# Patient Record
Sex: MALE | Race: BLACK OR AFRICAN AMERICAN
[De-identification: names, ages, dates, MRNs, and addresses within clinical notes are randomized per-mention and may not be internally consistent; named-entity substitution may affect disease eponyms.]

---

## 2017-02-22 ENCOUNTER — HOSPITAL ENCOUNTER (EMERGENCY)
Dept: HOSPITAL 62 - ER | Age: 22
Discharge: HOME | End: 2017-02-22
Payer: MEDICAID

## 2017-02-22 VITALS — DIASTOLIC BLOOD PRESSURE: 63 MMHG | SYSTOLIC BLOOD PRESSURE: 107 MMHG

## 2017-02-22 DIAGNOSIS — F41.0: Primary | ICD-10-CM

## 2017-02-22 DIAGNOSIS — R07.89: ICD-10-CM

## 2017-02-22 PROCEDURE — 93005 ELECTROCARDIOGRAM TRACING: CPT

## 2017-02-22 PROCEDURE — 93010 ELECTROCARDIOGRAM REPORT: CPT

## 2017-02-22 PROCEDURE — 71010: CPT

## 2017-02-22 PROCEDURE — 99284 EMERGENCY DEPT VISIT MOD MDM: CPT

## 2017-02-22 PROCEDURE — 84484 ASSAY OF TROPONIN QUANT: CPT

## 2017-02-22 PROCEDURE — 36415 COLL VENOUS BLD VENIPUNCTURE: CPT

## 2017-02-22 NOTE — ER DOCUMENT REPORT
ED General





- General


Chief Complaint: Anxiety


Stated Complaint: ANXIETY


Notes: 


Patient is a 22-year-old male presents with concerns of chest pain.  States 

that he was sitting on his couch and he began to experience left-sided chest 

pain that was sharp and stabbing in nature.  Nothing improved or worsen the 

pain.  States after several minutes of the pain he began to experience 

shortness of breath and began breathing quickly.  He felt "like my legs were 

full of ice" and states she was unable to get up off the couch without 

difficulty.  States he has a long-standing history of similar episodes in the 

past.  He denies any personal cardiac history.  No history of DVT or pulmonary 

embolus.  


TRAVEL OUTSIDE OF THE [place] IN LAST 30 DAYS: No





- Related Data


Allergies/Adverse Reactions: 


 





amoxicillin [Amoxicillin] Allergy (Verified 06/27/16 21:30)


 Anaphylaxis


ampicillin [Ampicillin] Allergy (Verified 06/27/16 21:30)


 Anaphylaxis


Penicillins Allergy (Verified 06/27/16 21:30)


 Anaphylaxis











Past Medical History





- General


Information source: Patient





- Social History


Smoking Status: Never Smoker


Frequency of alcohol use: None


Drug Abuse: None


Lives with: Family


Family History: Reviewed & Not Pertinent


Pulmonary Medical History: Reports: Hx Asthma





- Immunizations


Immunizations up to date: Yes


Hx Diphtheria, Pertussis, Tetanus Vaccination: Yes





Review of Systems





- Review of Systems


Notes: 


Constitutional: Negative for fever.


HENT: Negative for sore throat.


Eyes: Negative for visual changes.


Cardiovascular: Positive for chest pain.


Respiratory: Positive for shortness of breath.


Gastrointestinal: Negative for abdominal pain, vomiting or diarrhea.


Genitourinary: Negative for dysuria.


Musculoskeletal: Negative for back pain.


Skin: Negative for rash.


Neurological: Negative for headaches, weakness or numbness.





10 point ROS negative except as marked above and in HPI.





Physical Exam





- Vital signs


Interpretation: Normal


Notes: 


PHYSICAL EXAMINATION:





GENERAL: Well-appearing, well-nourished and in no acute distress.





HEAD: Atraumatic, normocephalic.





EYES: Pupils equal round and reactive to light, extraocular movements intact, 

sclera anicteric, conjunctiva are normal.





ENT: nares patent, oropharynx clear without exudates.  Moist mucous membranes.





NECK: Normal range of motion, supple without lymphadenopathy





LUNGS: Breath sounds clear to auscultation bilaterally and equal.  No wheezes 

rales or rhonchi.





HEART: Regular rate and rhythm without murmurs





ABDOMEN: Soft, nontender, normoactive bowel sounds.  No guarding, no rebound.  

No masses appreciated.





EXTREMITIES: Normal range of motion, no pitting or edema.  No cyanosis.





NEUROLOGICAL: No focal neurological deficits. Moves all extremities 

spontaneously and on command.





PSYCH: Anxious, tearful





SKIN: Warm, Dry, normal turgor, no rashes or lesions noted.





Course





- Re-evaluation


Re-evalutation: 





02/22/17 02:43


Patient presents with history most consistent with an acute panic attack.  The 

patient admits that these are symptoms identical to prior occasions of panic.  

There was a clear trigger for tonight's episode.  Symptoms did resolve after 

receiving medical therapy here in the emergency department.  Vitals otherwise 

within normal limits.  I do not suspect an acute pulmonary embolus, ACS, 

pneumothorax, or any other acute left threatening pathology based on history 

and exam.  I do not believe any labs or imaging are indicated at this time.  At 

this time will discharge with return precautions and follow-up recommendations.

  Verbal discharge instructions given a the bedside and opportunity for 

questions given. Medication warnings reviewed. Patient is in agreement with 

this plan and has verbalized understanding of return precautions and the need 

for primary care follow-up in the next 24-72 hours.





- Diagnostic Test


Radiology reviewed: Image reviewed, Reports reviewed


Radiology results interpreted by me: 





02/22/17 04:15


Chest x-ray: No acute infiltrate or pneumothorax





- EKG Interpretation by Me


Additional EKG results interpreted by me: 





02/22/17 04:15


Sinus bradycardia.  Rate 53.  No ST elevations or depressions.  QTC is 380.





Discharge





- Discharge


Clinical Impression: 


 Panic attack, Chest wall pain





Condition: Good


Disposition: HOME, SELF-CARE


Instructions:  Anxiety ([facility])


Additional Instructions: 


You were seen today for symptoms that are most suggestive of a panic attack.  

The symptoms can come on without any clear trigger.  It is important that you 

follow-up with your primary care physician regarding anxiety and panic attacks 

as there are medications that can help prevent these attacks or stop them once 

they start.  Please return to the emergency department immediately if you began 

having chest pain, shortness of breath, vomiting, difficulty breathing, or if 

you are again having a panic attack.  Please also return if you have any 

additional symptoms that are concerning to you.

## 2017-02-23 NOTE — EKG REPORT
SEVERITY:- NORMAL ECG -

SINUS RHYTHM

ST ELEV, PROBABLE NORMAL EARLY REPOL PATTERN

:

Confirmed by: Selina Manzanares MD 23-Feb-2017 08:14:37

## 2017-05-10 ENCOUNTER — HOSPITAL ENCOUNTER (EMERGENCY)
Dept: HOSPITAL 62 - ER | Age: 22
Discharge: HOME | End: 2017-05-10
Payer: COMMERCIAL

## 2017-05-10 VITALS — DIASTOLIC BLOOD PRESSURE: 70 MMHG | SYSTOLIC BLOOD PRESSURE: 135 MMHG

## 2017-05-10 DIAGNOSIS — R07.1: ICD-10-CM

## 2017-05-10 DIAGNOSIS — F17.200: ICD-10-CM

## 2017-05-10 DIAGNOSIS — K02.9: ICD-10-CM

## 2017-05-10 DIAGNOSIS — R19.7: ICD-10-CM

## 2017-05-10 DIAGNOSIS — J45.909: ICD-10-CM

## 2017-05-10 DIAGNOSIS — R59.1: ICD-10-CM

## 2017-05-10 DIAGNOSIS — R53.81: ICD-10-CM

## 2017-05-10 DIAGNOSIS — Z88.0: ICD-10-CM

## 2017-05-10 DIAGNOSIS — J02.9: Primary | ICD-10-CM

## 2017-05-10 DIAGNOSIS — Z87.892: ICD-10-CM

## 2017-05-10 PROCEDURE — 99283 EMERGENCY DEPT VISIT LOW MDM: CPT

## 2017-05-10 NOTE — ER DOCUMENT REPORT
ED General





- General


Chief Complaint: Fever


Stated Complaint: BODY PAIN,MOUTH PAIN,DIARRHEA


Time Seen by Provider: 05/10/17 14:45


Mode of Arrival: Ambulatory


Information source: Patient


Notes: 


22-year-old male with one-week history of bilateral sore throat left greater 

than right along with diffuse body aches, diarrhea, sharp anterior chest pain 

with deep breathing, and pain in the left upper tooth which she says it been 

fractured for about 3 years.  He is not aware of any fevers or chills at home.  

He denies any specific chest or abdominal pain.


Physical Exam:





General: Alert, appears well. 





HEENT: Normocephalic. Atraumatic. PERRLA. Extraocular movements intact.  Discs 

sharp Tympanic membranes and canals clear oropharynx shows erythema and whitish 

like to tonsillar area bilaterally.  There is dental defect to left upper molar 

no surrounding erythema or discharge suggestive of acute infection.  He has no 

stridor or hoarseness or drooling





Neck: Supple tender adenopathy bilaterally trachea midline no masses palpated.





Respiratory: No respiratory distress. Clear and equal breath sounds bilaterally.





Cardiovascular: Regular rate and rhythm. 





Abdominal: Normal Inspection. Soft, non-tender. No distension. Normal Bowel 

Sounds. 





Back: Non-tender. No deformity or step off.





Extremities warm to plus pulses of cyanosis no edema





Neurological: Each clear mentation normal





Psychological: Normal affect. Normal Mood. 





Skin: Warm. Dry. Normal color.


TRAVEL OUTSIDE OF THE [place] IN LAST 30 DAYS: No





- Related Data


Allergies/Adverse Reactions: 


 





amoxicillin [Amoxicillin] Allergy (Verified 06/27/16 21:30)


 Anaphylaxis


ampicillin [Ampicillin] Allergy (Verified 06/27/16 21:30)


 Anaphylaxis


Penicillins Allergy (Verified 06/27/16 21:30)


 Anaphylaxis











Past Medical History





- Social History


Smoking Status: Current Every Day Smoker


Chew tobacco use (# tins/day): No


Frequency of alcohol use: Occasional


Family History: Reviewed & Not Pertinent


Patient has suicidal ideation: No


Patient has homicidal ideation: No


Pulmonary Medical History: Reports: Hx Asthma


Renal/ Medical History: Denies: Hx Peritoneal Dialysis





- Immunizations


Immunizations up to date: Yes


Hx Diphtheria, Pertussis, Tetanus Vaccination: Yes





Review of Systems





- Review of Systems


Constitutional: Malaise


EENT: See HPI


Cardiovascular: See HPI


Respiratory: denies: Cough, Short of breath


Gastrointestinal: denies: Vomiting


Genitourinary: denies: Burning


Musculoskeletal: denies: Back pain


Skin: denies: Rash


Hematologic/Lymphatic: Enlarged lymph nodes


Neurological/Psychological: denies: Weakness, Numbness





Physical Exam





- Vital signs


Vitals: 





 











Temp Pulse Resp BP Pulse Ox


 


 101.2 F H  78   18   135/70 H  95 


 


 05/10/17 14:28  05/10/17 14:28  05/10/17 14:28  05/10/17 14:28  05/10/17 14:28














Course





- Re-evaluation


Re-evalutation: 





05/10/17 14:48


Patient presentation and exam consistent with acute pharyngitis.  We discharged 

with Zithromax Z-Jamie skims penicillin allergy


Predental defect will follow-up with the dental office





- Vital Signs


Vital signs: 





 











Temp Pulse Resp BP Pulse Ox


 


 101.2 F H  78   18   135/70 H  95 


 


 05/10/17 14:28  05/10/17 14:28  05/10/17 14:28  05/10/17 14:28  05/10/17 14:28














Discharge





- Discharge


Clinical Impression: 


 Dental caries





Acute pharyngitis


Qualifiers:


 Pharyngitis/tonsillitis etiology: unspecified etiology Qualified Code(s): 

J02.9 - Acute pharyngitis, unspecified





Condition: Stable


Disposition: HOME, SELF-CARE


Additional Instructions: 


Sore Throat





     Sore throats may be caused by viruses, bacteria, or fungi.  Most are due 

to a virus, and must get better on their own.  Bacterial sore throats, 

particularly those due to "strep," need treatment with antibiotics.


     If an antibiotic is prescribed, be sure to take the medication for a full 

10 days.  Failure to take the antibiotic can result in complications such as 

rheumatic fever.  Sometimes, an injection of antibiotics is given instead of 

pills or liquid.  This single "shot" is equal in effectiveness to the oral 

medication.


     To relieve symptoms, take acetaminophen for pain.  Sip clear liquids 

frequently, or eat popsicles or ice chips.  Anesthetic sprays or lozenges may 

help.  Make sure the air in the room is not too dry. Avoid using decongestants 

or antihistamines.


     Call the doctor if there is no improvement in two days, or if you have 

difficulty breathing, increasing throat pain, high fever, rash, or frequent 

vomiting.





Prescriptions: 


Azithromycin [Zithromax 250 mg Tablet] 250 mg PO ASDIR PRN #6 tablet


 PRN Reason: 


Referrals: 


[facility] [Provider Group] - Follow up as needed


[facility] [Provider Group] - Follow up as needed

## 2019-04-12 ENCOUNTER — HOSPITAL ENCOUNTER (EMERGENCY)
Dept: HOSPITAL 62 - ER | Age: 24
Discharge: HOME | End: 2019-04-12
Payer: MEDICAID

## 2019-04-12 VITALS — SYSTOLIC BLOOD PRESSURE: 136 MMHG | DIASTOLIC BLOOD PRESSURE: 67 MMHG

## 2019-04-12 DIAGNOSIS — R10.84: ICD-10-CM

## 2019-04-12 DIAGNOSIS — F17.200: ICD-10-CM

## 2019-04-12 DIAGNOSIS — R11.2: Primary | ICD-10-CM

## 2019-04-12 PROCEDURE — 99283 EMERGENCY DEPT VISIT LOW MDM: CPT

## 2019-04-12 NOTE — ER DOCUMENT REPORT
HPI





- HPI


Pain Level: 3


Notes: 





Patient is a 24-year-old male who presents requesting a work note as he missed 

work today for nausea and vomiting last night.  Patient believes that he ate bad

food at a [employer] restaurant yesterday abdominal cramping with nausea and 

vomiting.  Those symptoms have since resolved.  Patient states that his work is 

requesting a note.  He has no other concerns or complaints.  He is eating and 

drinking without difficulty.  He is urinating normally and having normal bowel 

moves.  No significant past medical history or surgical history to his abdomen. 

Denies any headache, fever, URI, sore throat, chest pain, palpitations, syncope,

cough, shortness of breath, wheeze, dyspnea, diarrhea, urinary retention, 

dysuria, hematuria, or rash.





- ROS


Systems Reviewed and Negative: Yes All other systems reviewed and negative





- REPRODUCTIVE


Reproductive: DENIES: Pregnant:





Past Medical History





- Social History


Smoking Status: Current Every Day Smoker


Family History: Reviewed & Not Pertinent


Pulmonary Medical History: Reports: Hx Asthma


Renal/ Medical History: Denies: Hx Peritoneal Dialysis





- Immunizations


Immunizations up to date: Yes


Hx Diphtheria, Pertussis, Tetanus Vaccination: Yes





Vertical Provider Document





- CONSTITUTIONAL


Agree With Documented VS: Yes


Notes: 





PHYSICAL EXAMINATION:





GENERAL: Well-appearing, well-nourished and in no acute distress.





HEAD: Atraumatic, normocephalic.





EYES: Pupils equal round and reactive to light, extraocular movements intact, 

sclera anicteric, conjunctiva are normal.





ENT:  Nares patent and without discharge.  oropharynx clear without exudates.  

No tonsilar hypertrophy or erythema.  Moist mucous membranes. 





NECK: Normal range of motion, supple without lymphadenopathy





LUNGS: Breath sounds clear to auscultation bilaterally and equal.  No wheezes 

rales or rhonchi.





HEART: Regular rate and rhythm without murmurs, rubs, gallops.





ABDOMEN: Soft, nontender, nondistended abdomen.  No guarding, no rebound.  No 

masses appreciated.  Normal bowel sounds present.  No CVA tenderness 

bilaterally.





PSYCH: Normal mood, normal affect.





SKIN: Warm, Dry, normal turgor, no rashes or lesions noted.





- INFECTION CONTROL


TRAVEL OUTSIDE OF THE [place] IN LAST 30 DAYS: No





Course





- Re-evaluation


Re-evalutation: 





04/12/19 15:55


Patient is an afebrile, well-hydrated, 24-year-old male who presents to the 

emergency department with resolved nausea, vomiting, and abdominal cramping.  

Vitals are acceptable without significant tachycardia, tachypnea, or hypoxia.  

PE is otherwise unremarkable.  Patient's abdomen is soft nontender.  He is 

nontoxic-appearing and is tolerating p.o. without difficulty.  Patient is 

currently asymptomatic.  No labs or imaging warranted.  Work note will be 

provided.  Low suspicion/risk for acute appendicitis, bowel obstruction, acute 

cholecystitis, perforated diverticulitis, incarcerated hernia, pancreatitis, 

perforated ulcer, peritonitis, sepsis, testicular torsion, or other systemic 

emergent condition at this time.  Patient is aware that his condition can change

from initial presentation and he needs to monitor symptoms closely and seek 

medical attention if any acute changes.  Conservative measures otherwise for sym

ptoms.  Recheck with PCM in 2-3 days.  Consider consult with a 

gastroenterologist.  Return to the ED with any worsening/concerning symptoms 

otherwise as reviewed in discharge.  Patient is in agreement.





- Vital Signs


Vital signs: 


                                        











Temp Pulse Resp BP Pulse Ox


 


 98.0 F   62   16   136/67 H  98 


 


 04/12/19 15:29  04/12/19 15:29  04/12/19 15:29  04/12/19 15:29  04/12/19 15:29














Discharge





- Discharge


Clinical Impression: 


Nausea & vomiting


Qualifiers:


 Vomiting type: unspecified Vomiting Intractability: non-intractable Qualified 

Code(s): R11.2 - Nausea with vomiting, unspecified





Condition: Stable


Disposition: HOME, SELF-CARE


Additional Instructions: 


Maintain adequate fluid and food intake


[place] diet ([employer] Bananas, rice, apples, toast, etc


tylenol if needed


Monitor for any worsening symptoms


Make sure you are staying hydrated enough to urinate and have normal BM's


Recheck with your PCM in 2-3 days


Consider consult with Gastroenterology for ongoing/worsening symptoms


Return to the ED with any worsening symptoms and/or development of fever, 

headache, chest pain, palpitations, syncope, shortness of breath, trouble 

breathing, abdominal pain, n/v/d, blood in stool/urine, weakness, or other 

worsening symptoms that are concerning to you.  


Forms:  Elevated Blood Pressure, Smoking Cessation Education, Return to Work


Referrals: 


ELEAZAR PARKINSON MD [ACTIVE STAFF] - Follow up as needed

## 2019-04-13 ENCOUNTER — HOSPITAL ENCOUNTER (EMERGENCY)
Dept: HOSPITAL 62 - ER | Age: 24
Discharge: LEFT BEFORE DISCHARGE | End: 2019-04-13
Payer: MEDICAID

## 2019-04-13 DIAGNOSIS — Z53.21: Primary | ICD-10-CM

## 2019-04-13 LAB
ADD MANUAL DIFF: NO
ALBUMIN SERPL-MCNC: 3.8 G/DL (ref 3.5–5)
ALP SERPL-CCNC: 62 U/L (ref 38–126)
ALT SERPL-CCNC: 37 U/L (ref 21–72)
ANION GAP SERPL CALC-SCNC: 5 MMOL/L (ref 5–19)
APPEARANCE UR: CLEAR
APTT PPP: YELLOW S
AST SERPL-CCNC: 21 U/L (ref 17–59)
BASOPHILS # BLD AUTO: 0 10^3/UL (ref 0–0.2)
BASOPHILS NFR BLD AUTO: 0.5 % (ref 0–2)
BILIRUB DIRECT SERPL-MCNC: 0.2 MG/DL (ref 0–0.4)
BILIRUB SERPL-MCNC: 0.3 MG/DL (ref 0.2–1.3)
BILIRUB UR QL STRIP: NEGATIVE
BUN SERPL-MCNC: 18 MG/DL (ref 7–20)
CALCIUM: 9.3 MG/DL (ref 8.4–10.2)
CHLORIDE SERPL-SCNC: 106 MMOL/L (ref 98–107)
CO2 SERPL-SCNC: 28 MMOL/L (ref 22–30)
EOSINOPHIL # BLD AUTO: 0.2 10^3/UL (ref 0–0.6)
EOSINOPHIL NFR BLD AUTO: 2.9 % (ref 0–6)
ERYTHROCYTE [DISTWIDTH] IN BLOOD BY AUTOMATED COUNT: 13.2 % (ref 11.5–14)
GLUCOSE SERPL-MCNC: 82 MG/DL (ref 75–110)
GLUCOSE UR STRIP-MCNC: NEGATIVE MG/DL
HCT VFR BLD CALC: 39.8 % (ref 37.9–51)
HGB BLD-MCNC: 14.4 G/DL (ref 13.5–17)
KETONES UR STRIP-MCNC: NEGATIVE MG/DL
LIPASE SERPL-CCNC: 271.9 U/L (ref 23–300)
LYMPHOCYTES # BLD AUTO: 2.6 10^3/UL (ref 0.5–4.7)
LYMPHOCYTES NFR BLD AUTO: 33.3 % (ref 13–45)
MCH RBC QN AUTO: 30.1 PG (ref 27–33.4)
MCHC RBC AUTO-ENTMCNC: 36.3 G/DL (ref 32–36)
MCV RBC AUTO: 83 FL (ref 80–97)
MONOCYTES # BLD AUTO: 0.6 10^3/UL (ref 0.1–1.4)
MONOCYTES NFR BLD AUTO: 7.3 % (ref 3–13)
NEUTROPHILS # BLD AUTO: 4.4 10^3/UL (ref 1.7–8.2)
NEUTS SEG NFR BLD AUTO: 56 % (ref 42–78)
NITRITE UR QL STRIP: NEGATIVE
PH UR STRIP: 6 [PH] (ref 5–9)
PLATELET # BLD: 229 10^3/UL (ref 150–450)
POTASSIUM SERPL-SCNC: 4.1 MMOL/L (ref 3.6–5)
PROT SERPL-MCNC: 6.4 G/DL (ref 6.3–8.2)
PROT UR STRIP-MCNC: NEGATIVE MG/DL
RBC # BLD AUTO: 4.8 10^6/UL (ref 4.35–5.55)
SODIUM SERPL-SCNC: 139.1 MMOL/L (ref 137–145)
SP GR UR STRIP: 1.02
TOTAL CELLS COUNTED % (AUTO): 100 %
UROBILINOGEN UR-MCNC: NEGATIVE MG/DL (ref ?–2)
WBC # BLD AUTO: 7.8 10^3/UL (ref 4–10.5)

## 2019-04-13 PROCEDURE — 85025 COMPLETE CBC W/AUTO DIFF WBC: CPT

## 2019-04-13 PROCEDURE — 36415 COLL VENOUS BLD VENIPUNCTURE: CPT

## 2019-04-13 PROCEDURE — 80053 COMPREHEN METABOLIC PANEL: CPT

## 2019-04-13 PROCEDURE — 81001 URINALYSIS AUTO W/SCOPE: CPT

## 2019-04-13 PROCEDURE — 83690 ASSAY OF LIPASE: CPT

## 2020-03-27 ENCOUNTER — HOSPITAL ENCOUNTER (EMERGENCY)
Dept: HOSPITAL 62 - ER | Age: 25
Discharge: HOME | End: 2020-03-27
Payer: MEDICAID

## 2020-03-27 VITALS — DIASTOLIC BLOOD PRESSURE: 91 MMHG | SYSTOLIC BLOOD PRESSURE: 151 MMHG

## 2020-03-27 DIAGNOSIS — S93.409A: Primary | ICD-10-CM

## 2020-03-27 DIAGNOSIS — Z88.0: ICD-10-CM

## 2020-03-27 DIAGNOSIS — M25.572: ICD-10-CM

## 2020-03-27 DIAGNOSIS — J45.909: ICD-10-CM

## 2020-03-27 DIAGNOSIS — Z87.892: ICD-10-CM
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
## 2020-03-27 DIAGNOSIS — T14.8XXA: ICD-10-CM

## 2020-03-27 PROCEDURE — 99283 EMERGENCY DEPT VISIT LOW MDM: CPT

## 2020-03-27 NOTE — ER DOCUMENT REPORT
ED General





- General


Chief Complaint: Foot Injury


Stated Complaint: FOOT INJURY


Notes: 





Patient is a 25-year-old -American male with no significant past medical 

history presents to the emergency department the chief complaint of left ankle 

pain that occurred 3 days ago.  He states he fell down several flights of 

stairs.  He reports the ankle everted when he fell.  States he has been trying 

to see if it would get better on its own but the pain persists so his wife urged

him to come.  States pain is worse with dorsiflexion and plantarflexion of the 

foot.  Pain diffusely across the ankle, radiates to the back of the leg.  Denies

any weakness, numbness, tingling.


TRAVEL OUTSIDE OF THE [place] IN LAST 30 DAYS: No





- Related Data


Allergies/Adverse Reactions: 


                                        





amoxicillin [Amoxicillin] Allergy (Verified 04/13/19 22:01)


   Anaphylaxis


ampicillin [Ampicillin] Allergy (Verified 04/13/19 22:01)


   Anaphylaxis


Penicillins Allergy (Verified 04/13/19 22:01)


   Anaphylaxis











Past Medical History





- Social History


Smoking Status: Unknown if Ever Smoked


Family History: Reviewed & Not Pertinent


Pulmonary Medical History: Reports: Hx Asthma


Renal/ Medical History: Denies: Hx Peritoneal Dialysis





- Immunizations


Immunizations up to date: Yes


Hx Diphtheria, Pertussis, Tetanus Vaccination: Yes





Review of Systems





- Review of Systems


Musculoskeletal: Joint pain


-: Yes All other systems reviewed and negative





Physical Exam





- Vital signs


Vitals: 


                                        











Temp Pulse Resp BP Pulse Ox


 


 98.9 F   62   20   181/75 H  97 


 


 03/27/20 16:12  03/27/20 16:12  03/27/20 16:12  03/27/20 16:12  03/27/20 16:12














- General


General appearance: Appears well, Alert


In distress: None





- Respiratory


Respiratory status: No respiratory distress


Chest status: Nontender


Breath sounds: Normal


Chest palpation: Normal





- Cardiovascular


Rhythm: Regular


Heart sounds: Normal auscultation





- Extremities


Calf: Tender


Ankle: Tender, Other - Full passive range of motion.  Gait limited by pain.  2+ 

DP/PT.  No deformity step-off or crepitus.





- Neurological


Neuro grossly intact: Yes


Cognition: Normal


Orientation: AAOx4


[place] Coma Scale Eye Opening: Spontaneous


[place] Coma Scale Verbal: Oriented


[place] Coma Scale Motor: Obeys Commands


[place] Coma Scale Total: 15


Speech: Normal


Motor strength normal: LUE, RUE, LLE, RLE


Sensory: Normal





- Psychological


Associated symptoms: Normal affect, Normal mood





- Skin


Skin Temperature: Warm


Skin Moisture: Dry


Skin Color: Normal





Course





- Vital Signs


Vital signs: 


                                        











Temp Pulse Resp BP Pulse Ox


 


 98.9 F   62   20   181/75 H  97 


 


 03/27/20 16:12  03/27/20 16:12  03/27/20 16:12  03/27/20 16:12  03/27/20 16:12














Discharge





- Discharge


Clinical Impression: 


 Muscle strain





Ankle sprain


Qualifiers:


 Encounter type: initial encounter Involved ligament of ankle: unspecified 

ligament Laterality: unspecified laterality Qualified Code(s): S93.409A - Sprain

 of unspecified ligament of unspecified ankle, initial encounter





Condition: Stable


Disposition: HOME, SELF-CARE


Instructions:  Sprained Ankle ([facility])


Additional Instructions: 


Follow-up with your regular doctor in 2 to 3 days for reevaluation.  Return here

 or any ER immediately with any new, persistent or worsening symptoms.


Prescriptions: 


Ketorolac Tromethamine [Toradol 10 mg Tablet] 10 mg PO Q8HP PRN #24 tablet


 PRN Reason: 


Forms:  Return to Work


Referrals: 


JANE WOOD JR, DO [ACTIVE PROVISIONAL STAFF] - Follow up as needed

## 2020-03-27 NOTE — RADIOLOGY REPORT ([facility])
EXAM DESCRIPTION:  ANKLE LEFT COMPLETE



IMAGES COMPLETED DATE/TIME:  3/27/2020 4:38 pm



REASON FOR STUDY:  pain, fall



COMPARISON:  None.



NUMBER OF VIEWS:  Three views.



TECHNIQUE:  AP, lateral, and oblique radiographic images acquired of the left ankle.



LIMITATIONS:  None.



FINDINGS:  MINERALIZATION: Normal.

BONES: No acute fracture or dislocation.

JOINTS: No effusions.

SOFT TISSUES: No soft tissue swelling.

OTHER: No other finding.



IMPRESSION:  No acute osseous abnormality of the left ankle.



TECHNICAL DOCUMENTATION:  JOB ID:  [id number]

 2011 [employer]- All Rights Reserved



Reading location - IP/workstation name: [email or web address]